# Patient Record
Sex: MALE | Race: WHITE | NOT HISPANIC OR LATINO | URBAN - METROPOLITAN AREA
[De-identification: names, ages, dates, MRNs, and addresses within clinical notes are randomized per-mention and may not be internally consistent; named-entity substitution may affect disease eponyms.]

---

## 2018-11-05 PROBLEM — Z00.00 ENCOUNTER FOR PREVENTIVE HEALTH EXAMINATION: Status: ACTIVE | Noted: 2018-11-05

## 2023-09-19 ENCOUNTER — EMERGENCY (EMERGENCY)
Facility: HOSPITAL | Age: 38
LOS: 1 days | Discharge: HOME | End: 2023-09-19
Attending: EMERGENCY MEDICINE | Admitting: STUDENT IN AN ORGANIZED HEALTH CARE EDUCATION/TRAINING PROGRAM
Payer: SELF-PAY

## 2023-09-19 VITALS
OXYGEN SATURATION: 98 % | HEART RATE: 112 BPM | HEIGHT: 71 IN | RESPIRATION RATE: 18 BRPM | TEMPERATURE: 98 F | WEIGHT: 169.98 LBS | SYSTOLIC BLOOD PRESSURE: 172 MMHG | DIASTOLIC BLOOD PRESSURE: 95 MMHG

## 2023-09-19 PROCEDURE — 99283 EMERGENCY DEPT VISIT LOW MDM: CPT | Mod: 25

## 2023-09-19 PROCEDURE — 99284 EMERGENCY DEPT VISIT MOD MDM: CPT

## 2023-09-19 PROCEDURE — 90471 IMMUNIZATION ADMIN: CPT

## 2023-09-19 RX ORDER — OFLOXACIN 0.3 %
2 DROPS OPHTHALMIC (EYE)
Qty: 1 | Refills: 0
Start: 2023-09-19 | End: 2023-09-23

## 2023-09-19 RX ORDER — OFLOXACIN 0.3 %
2 DROPS OPHTHALMIC (EYE) ONCE
Refills: 0 | Status: COMPLETED | OUTPATIENT
Start: 2023-09-19 | End: 2023-09-19

## 2023-09-19 RX ORDER — TETANUS TOXOID, REDUCED DIPHTHERIA TOXOID AND ACELLULAR PERTUSSIS VACCINE, ADSORBED 5; 2.5; 8; 8; 2.5 [IU]/.5ML; [IU]/.5ML; UG/.5ML; UG/.5ML; UG/.5ML
0.5 SUSPENSION INTRAMUSCULAR ONCE
Refills: 0 | Status: COMPLETED | OUTPATIENT
Start: 2023-09-19 | End: 2023-09-19

## 2023-09-19 RX ADMIN — Medication 2 DROP(S): at 22:46

## 2023-09-19 NOTE — ED PROVIDER NOTE - PROGRESS NOTE DETAILS
Spoke with ophthalmology [facility] center Sushil Crystal. as discussed, concern for foreign body, recommend Ocuflox, patient has no insurance, patient to follow-up at [facility] ophthalmology clinic for [facility], states he will call patient tomorrow

## 2023-09-19 NOTE — ED PROVIDER NOTE - PHYSICAL EXAMINATION
Visual acuity 20/20 in both eyes, right eye intraocular pressure is 18, right eye noted injected conjunctiva, small foreign body in right eye/cornea on medial aspect at 3:00, inverted eyelid and noted no foreign body, applied tetracaine and fluorescein stain, no uptake, no Joanie sign

## 2023-09-19 NOTE — ED ADULT TRIAGE NOTE - CHIEF COMPLAINT QUOTE
pt works with motor vehicles and wheels, got something in Rt eye yesterday, pain and redness getting worse today

## 2023-09-19 NOTE — ED PROVIDER NOTE - NSFOLLOWUPINSTRUCTIONS_ED_ALL_ED_FT
Follow up with [facility] [id number], [place] [address] eye clinic, [facility] 4th floor. You will receive a call from Ophthalmologist tomorrow.  Apply Ocuflox antibiotic eyedrops 2 drops to right eye 4 times daily.  Return to the emergency department if having severe pain, change in vision and or worsening of symptoms.

## 2023-09-19 NOTE — ED PROVIDER NOTE - PATIENT PORTAL LINK FT
You can access the FollowMyHealth Patient Portal offered by [facility] by registering at the following website: [email or web address]. By joining [employer]’s FollowMyHealth portal, you will also be able to view your health information using other applications (apps) compatible with our system.

## 2023-09-19 NOTE — ED PROVIDER NOTE - OBJECTIVE STATEMENT
38-year-old male with past medical history of Burkitt's lymphoma, treated with chemo more than 13 years ago, presents to the emergency department states that while at work, he polishes tires, and had foreign body sensation to his right eye, today having increased irritation and increased tearing, states he felt something is inside his eye.

## 2023-09-19 NOTE — ED ADULT NURSE NOTE - OBJECTIVE STATEMENT
Pt presents to ED after piece of metal became lodged in his R eye at work yesterday, pt endorses he does not wear safety goggles at work. Today eye is red inflamed and small piece of foreign material can be seen in eye
